# Patient Record
Sex: FEMALE | Race: WHITE | URBAN - METROPOLITAN AREA
[De-identification: names, ages, dates, MRNs, and addresses within clinical notes are randomized per-mention and may not be internally consistent; named-entity substitution may affect disease eponyms.]

---

## 2019-01-21 ENCOUNTER — APPOINTMENT (RX ONLY)
Dept: URBAN - METROPOLITAN AREA CLINIC 145 | Facility: CLINIC | Age: 39
Setting detail: DERMATOLOGY
End: 2019-01-21

## 2019-01-21 DIAGNOSIS — L81.4 OTHER MELANIN HYPERPIGMENTATION: ICD-10-CM

## 2019-01-21 DIAGNOSIS — L73.8 OTHER SPECIFIED FOLLICULAR DISORDERS: ICD-10-CM

## 2019-01-21 PROBLEM — R23.3 SPONTANEOUS ECCHYMOSES: Status: ACTIVE | Noted: 2019-01-21

## 2019-01-21 PROBLEM — D48.5 NEOPLASM OF UNCERTAIN BEHAVIOR OF SKIN: Status: ACTIVE | Noted: 2019-01-21

## 2019-01-21 PROCEDURE — ? COUNSELING

## 2019-01-21 PROCEDURE — 11102 TANGNTL BX SKIN SINGLE LES: CPT

## 2019-01-21 PROCEDURE — 99202 OFFICE O/P NEW SF 15 MIN: CPT

## 2019-01-21 PROCEDURE — ? DERMTECH: PLA - PIGMENTED LESION ASSAY

## 2019-01-21 ASSESSMENT — LOCATION ZONE DERM: LOCATION ZONE: FACE

## 2019-01-21 ASSESSMENT — LOCATION SIMPLE DESCRIPTION DERM
LOCATION SIMPLE: RIGHT CHEEK
LOCATION SIMPLE: GLABELLA

## 2019-01-21 ASSESSMENT — LOCATION DETAILED DESCRIPTION DERM
LOCATION DETAILED: GLABELLA
LOCATION DETAILED: RIGHT INFERIOR CENTRAL MALAR CHEEK

## 2019-01-21 NOTE — PROCEDURE: [facility]: PLA - PIGMENTED LESION ASSAY
Biopsy Type: RNA evaluation
Detail Level: Detailed
Consent: Written consent was obtained and risks were reviewed including but not limited to mild pain and skin irritation.
Billing Type: Third-Party Bill
X Size Of Lesion In Cm (Optional): 0
Post-Care Instructions: I reviewed with the patient in detail post-care instructions.
Body Location Override (Optional - Billing Will Still Be Based On Selected Body Map Location If Applicable): right cheek
Procedure Details: The first patch was applied to the lesion, adhesive side down, and rubbed in a circular motion 15 times.  Following this the lesion was outlined with a permanent marker and the patch was then secured, adhesive side down, in the provided transport case.  This process was repeated for patches 2 through 4 and then sent to [facility] in the provided self-addressed envelope.
Render Post-Care Instructions In Note?: no
Size Of Lesion In Cm (Optional): 1.4
Notification Instructions: Patient will be notified of biopsy results. However, patient instructed to call the office if not contacted within 2 weeks.